# Patient Record
Sex: MALE | Race: BLACK OR AFRICAN AMERICAN
[De-identification: names, ages, dates, MRNs, and addresses within clinical notes are randomized per-mention and may not be internally consistent; named-entity substitution may affect disease eponyms.]

---
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
## 2020-12-15 VITALS
SYSTOLIC BLOOD PRESSURE: 122 MMHG | DIASTOLIC BLOOD PRESSURE: 76 MMHG | DIASTOLIC BLOOD PRESSURE: 76 MMHG | SYSTOLIC BLOOD PRESSURE: 122 MMHG

## 2021-04-25 ENCOUNTER — HOSPITAL ENCOUNTER (EMERGENCY)
Dept: HOSPITAL 61 - ER | Age: 21
Discharge: LEFT BEFORE DISCHARGE | End: 2021-04-25
Payer: COMMERCIAL

## 2021-04-25 DIAGNOSIS — Z53.21: ICD-10-CM

## 2021-04-25 DIAGNOSIS — M79.605: Primary | ICD-10-CM

## 2021-12-31 ENCOUNTER — HOSPITAL ENCOUNTER (EMERGENCY)
Dept: HOSPITAL 61 - ER | Age: 21
Discharge: LEFT BEFORE DISCHARGE | End: 2021-12-31
Payer: SELF-PAY

## 2021-12-31 DIAGNOSIS — Z53.21: ICD-10-CM

## 2021-12-31 DIAGNOSIS — R04.0: Primary | ICD-10-CM
